# Patient Record
Sex: MALE | Race: BLACK OR AFRICAN AMERICAN
[De-identification: names, ages, dates, MRNs, and addresses within clinical notes are randomized per-mention and may not be internally consistent; named-entity substitution may affect disease eponyms.]

---

## 2018-04-30 ENCOUNTER — HOSPITAL ENCOUNTER (EMERGENCY)
Dept: HOSPITAL 1 - ED | Age: 33
Discharge: HOME | End: 2018-04-30
Payer: COMMERCIAL

## 2018-04-30 VITALS — WEIGHT: 134.99 LBS | HEIGHT: 67.99 IN | BODY MASS INDEX: 20.46 KG/M2

## 2018-04-30 VITALS — DIASTOLIC BLOOD PRESSURE: 67 MMHG | SYSTOLIC BLOOD PRESSURE: 135 MMHG

## 2018-04-30 DIAGNOSIS — I10: ICD-10-CM

## 2018-04-30 DIAGNOSIS — K59.00: Primary | ICD-10-CM

## 2018-04-30 DIAGNOSIS — E11.9: ICD-10-CM

## 2018-04-30 LAB
ALBUMIN SERPL-MCNC: 3.7 G/DL (ref 3.4–5)
ALP SERPL-CCNC: 71 U/L (ref 46–116)
ALT SERPL-CCNC: 38 U/L (ref 16–63)
AMYLASE SERPL-CCNC: 86 U/L (ref 25–115)
AST SERPL-CCNC: 46 U/L (ref 15–37)
BASOPHILS NFR BLD: 0.9 % (ref 0–2)
BILIRUB SERPL-MCNC: 0.2 MG/DL (ref 0.2–1)
BUN SERPL-MCNC: 14 MG/DL (ref 7–18)
CALCIUM SERPL-MCNC: 8.2 MG/DL (ref 8.5–10.1)
CHLORIDE SERPL-SCNC: 103 MMOL/L (ref 98–107)
CO2 SERPL-SCNC: 27.9 MMOL/L (ref 21–32)
CREAT SERPL-MCNC: 0.8 MG/DL (ref 0.7–1.3)
ERYTHROCYTE [DISTWIDTH] IN BLOOD BY AUTOMATED COUNT: 13.8 % (ref 11.5–14.5)
GFR SERPLBLD BASED ON 1.73 SQ M-ARVRAT: > 60 ML/MIN
GLUCOSE SERPL-MCNC: 269 MG/DL (ref 74–106)
LIPASE SERPL-CCNC: 48 IU/L (ref 73–393)
MICROSCOPIC UR-IMP: NO
PLATELET # BLD: 226 X10^3MCL (ref 130–400)
POTASSIUM SERPL-SCNC: 4.5 MMOL/L (ref 3.5–5.1)
PROT SERPL-MCNC: 6.5 G/DL (ref 6.4–8.2)
RBC # UR STRIP.AUTO: NEGATIVE /UL
SODIUM SERPL-SCNC: 136 MMOL/L (ref 136–145)
UA SPECIFIC GRAVITY: 1.01 (ref 1–1.03)

## 2018-05-04 ENCOUNTER — HOSPITAL ENCOUNTER (INPATIENT)
Dept: HOSPITAL 36 - ER | Age: 33
Discharge: LEFT BEFORE DISCHARGE | DRG: 445 | End: 2018-05-04
Attending: FAMILY MEDICINE | Admitting: FAMILY MEDICINE
Payer: COMMERCIAL

## 2018-05-04 DIAGNOSIS — E87.1: ICD-10-CM

## 2018-05-04 DIAGNOSIS — E11.43: ICD-10-CM

## 2018-05-04 DIAGNOSIS — K80.20: Primary | ICD-10-CM

## 2018-05-04 DIAGNOSIS — Z90.49: ICD-10-CM

## 2018-05-04 DIAGNOSIS — E86.0: ICD-10-CM

## 2018-05-04 DIAGNOSIS — Z53.21: ICD-10-CM

## 2018-05-04 DIAGNOSIS — K31.84: ICD-10-CM

## 2018-05-04 DIAGNOSIS — K56.609: ICD-10-CM

## 2018-05-04 DIAGNOSIS — R56.9: ICD-10-CM

## 2018-05-04 DIAGNOSIS — F17.210: ICD-10-CM

## 2018-05-04 DIAGNOSIS — E03.9: ICD-10-CM

## 2018-05-04 DIAGNOSIS — K59.00: ICD-10-CM

## 2018-05-04 DIAGNOSIS — K52.9: ICD-10-CM

## 2018-05-04 DIAGNOSIS — D64.9: ICD-10-CM

## 2018-05-04 LAB
ANION GAP SERPL CALC-SCNC: 12.6 MMOL/L (ref 7–16)
APPEARANCE UR: CLEAR
BACTERIA #/AREA URNS HPF: (no result) /HPF
BASOPHILS # BLD AUTO: 0.1 TH/CUMM (ref 0–0.2)
BASOPHILS NFR BLD AUTO: 0.9 % (ref 0–2)
BILIRUB UR-MCNC: NEGATIVE MG/DL
BUN SERPL-MCNC: 13 MG/DL (ref 7–25)
CALCIUM SERPL-MCNC: 8.8 MG/DL (ref 8.6–10.3)
CHLORIDE SERPL-SCNC: 102 MEQ/L (ref 98–107)
CO2 SERPL-SCNC: 23 MEQ/L (ref 21–31)
COLOR UR: YELLOW
CREAT SERPL-MCNC: 0.8 MG/DL (ref 0.7–1.3)
EOSINOPHIL # BLD AUTO: 0 TH/CMM (ref 0.1–0.4)
EOSINOPHIL NFR BLD AUTO: 0.2 % (ref 0–5)
EPI CELLS URNS QL MICRO: (no result) /LPF
ERYTHROCYTE [DISTWIDTH] IN BLOOD BY AUTOMATED COUNT: 13.7 % (ref 11.5–20)
GLUCOSE SERPL-MCNC: 368 MG/DL (ref 70–105)
GLUCOSE UR STRIP-MCNC: >=1000 MG/DL
HBA1C MFR BLD: 9.4 % (ref 4–6)
HCT VFR BLD CALC: 34.4 % (ref 41–60)
HGB BLD-MCNC: 11.7 GM/DL (ref 12–16)
HGB BLD-MCNC: 13 G/DL (ref 4–35)
KETONES UR STRIP-MCNC: 15 MG/DL
LEUKOCYTE ESTERASE UR-ACNC: NEGATIVE
LYMPHOCYTE AB SER FC-ACNC: 1 TH/CMM (ref 1.5–3)
LYMPHOCYTES NFR BLD AUTO: 12.1 % (ref 20–50)
MAGNESIUM SERPL-MCNC: 2.1 MG/DL (ref 1.9–2.7)
MCH RBC QN AUTO: 29.6 PG (ref 26–30)
MCHC RBC AUTO-ENTMCNC: 34 PG (ref 28–36)
MCV RBC AUTO: 87.2 FL (ref 80–99)
MICRO URNS: YES
MONOCYTES # BLD AUTO: 0.6 TH/CMM (ref 0.3–1)
MONOCYTES NFR BLD AUTO: 7.1 % (ref 2–10)
NEUTROPHILS # BLD: 6.7 TH/CMM (ref 1.8–8)
NEUTROPHILS NFR BLD AUTO: 79.7 % (ref 40–80)
NITRITE UR QL STRIP: NEGATIVE
PH UR STRIP: 6 [PH] (ref 4.6–8)
PLATELET # BLD: 230 TH/CMM (ref 150–400)
PMV BLD AUTO: 7.4 FL
POTASSIUM SERPL-SCNC: 3.6 MEQ/L (ref 3.5–5.1)
PROT UR STRIP-MCNC: NEGATIVE MG/DL
RBC # BLD AUTO: 3.95 MIL/CMM (ref 4.3–5.7)
RBC # UR STRIP: NEGATIVE /UL
RBC #/AREA URNS HPF: (no result) /HPF (ref 0–5)
SODIUM SERPL-SCNC: 134 MEQ/L (ref 136–145)
SP GR UR STRIP: 1.02 (ref 1–1.03)
URINALYSIS COMPLETE PNL UR: (no result)
UROBILINOGEN UR STRIP-ACNC: 0.2 E.U./DL (ref 0.2–1)
WBC # BLD AUTO: 8.4 TH/CMM (ref 4.8–10.8)
WBC #/AREA URNS HPF: (no result) /HPF (ref 0–5)
YEAST URNS QL MICRO: (no result) /HPF

## 2018-05-04 NOTE — DIAGNOSTIC IMAGING REPORT
CT scan abdomen and pelvis without intravenous contrast



HISTORY: Pain



Total DLP equals 300



CTDI equals 6.3



Axial sections were obtained from the xiphoid process down to the pubic

symphysis.



The liver exhibits a normal size and contour.  No focal lesions.  Faint

intraluminal hyperdensities seen in the gallbladder consistent with

changes of cholelithiasis.  The spleen appears normal.  No focal

abnormality seen in the kidneys.  No hydronephrosis.



The exam of the pelvis demonstrates preservation of normal fat planes. 

No abnormal soft tissue masses or abnormal fluid collections.



There is mildly distended stool-filled large bowel.  Changes may be

associated with constipation.  Hypodensity seen in the region of the

appendix.  Small appendicoliths cannot be excluded.  Question surgical

changes.



IMPRESSION:

1.  Stool filled somewhat distended large bowel suggesting constipation

2.  Findings consistent with cholelithiasis

3.  Hyperdensity in the region of the appendix that may be associated

with small appendicoliths.  Questionable surgical changes in the area. 

Findings should be correlated clinically and with patient's symptoms and

surgical history.

## 2018-05-04 NOTE — ED PHYSICIAN CHART
ED Chief Complaint/HPI





- Patient Information


Date Seen:: 05/04/18


Time Seen:: 06:45


Chief Complaint:: abdominal pain


History of Present Illness:: 





Yesterday afternoon patient developed vomiting and diarrhea.  He vomited about 

10 times the last 3 times since he's arrived in the emergency department.  He 

has had 5-6 times diarrhea last time about 15 minutes ago.  Right-sided 

abdominal pain started last night.


Allergies:: 


 Allergies











Allergy/AdvReac Type Severity Reaction Status Date / Time


 


No Known Allergies Allergy   Verified 05/04/18 06:38











Vitals:: 


 Vital Signs - 8 hr











  05/04/18





  06:38


 


Temp 98.5 F


 


HR 73


 


RR 18


 


/81


 


O2 Sat % 100











Historian:: Patient


Review:: Nurse's Note Reviewed





<Kilo Golden - Last Filed: 05/04/18 07:13>





- Patient Information


Allergies:: 


 Allergies











Allergy/AdvReac Type Severity Reaction Status Date / Time


 


No Known Allergies Allergy   Verified 05/04/18 06:38











Vitals:: 


 Vital Signs - 8 hr











  05/04/18 05/04/18





  06:38 08:03


 


Temp 98.5 F 


 


HR 73 75


 


RR 18 16


 


/81 144/83


 


O2 Sat % 100 100














<Aire Phoenix - Last Filed: 05/04/18 08:43>





ED Review of Systems





- Review of Systems


General/Constitutional: No fever, No chills, No weight loss, No weakness, No 

diaphoresis, No edema, No loss of appetite


Skin: No skin lesions, No rash, No bruising


Head: No headache, No light-headedness


Eyes: No loss of vision, No pain, No diplopia


ENT: No earache, No nasal drainage, No sore throat, No tinnitus


Neck: No neck pain, No swelling, No thyromegaly, No stiffness, No mass noted


Cardio Vascular: No chest pain, No palpitations, No PND, No orthopnea, No edema


Pulmonary: No SOB, No cough, No sputum, No wheezing


GI: Nausea, Vomiting, Diarrhea, No pain, No melena, No hematochezia, No 

constipation, No hematemesis


G/U: No dysuria, No frequency, No hematuria


Musculoskeletal: No bone or joint pain, No back pain, No muscle pain


Endocrine: No polyuria, No polydipsia


Psychiatric: No prior psych history, No depression, No anxiety, No suicidal 

ideation


Hematopoietic: No bruising, No lymphadenopathy


Allergic/Immuno: No urticaria, No angioedema


Neurological: No syncope, No focal symptoms, No weakness, No paresthesia, No 

headache, No seizure, No dizziness, No confusion, No vertigo





<Kilo Golden - Last Filed: 05/04/18 07:13>





ED Past Medical History





- Past Medical History


Past Medical History: DM, Seizures, Thyroid disorder, Other (hypothyroidism)


Family History: Diabetes Melitus, Other (cirrhosis)


Social History: Smoker, No Alcohol, Other (smokes 4-5 cigarettes a day)


Surgical History: Appendectomy, other (patient had a laparotomy 3 years ago for 

possibly a ruptured appendix (patient isn't absolutely sure))


Psychiatricy History: None


Medication: Reviewed





<Kilo Golden - Last Filed: 05/04/18 07:13>





Family Medical History





- Family Member


  ** Mother


Other Medical History: CIRRHOSIS





  ** Brother


Hx Family Diabetes: Yes





<Kilo Golden - Last Filed: 05/04/18 07:13>





ED Physical Exam





- Physical Examination


General/Constitutional: Awake


Head: Atraumatic


Eyes: Lids, conjuctiva normal, PERRL, EOMI


Skin: Nl inspection, No rash, No skin lesions, No ecchymosis, Well hydrated, No 

lymphadenopathy


ENMT: External ears, nose nl, Nasal exam nl, Lips, teeth, gums nl


Neck: Nontender, Full ROM w/o pain, No JVD, No nuchal rigidity, No bruit, No 

mass, No stridor


Respiratory: Nl effort/Exclusion, Clear to Auscultation, No Wheeze/Rhonchi/Rales


Cardio Vascular: RRR, No murmur, gallop, rubs, NL S1 S2


Other GI comments:: 





Abdomen 2 out of 4 distended; hyperactive bowel sounds; diffuse tenderness with 

rebound tenderness; right sided tenderness is more than left-sided tenderness.  

About 25 cm long midline abdominal incision scar present.


: No CVA tenderness


Extremities: No tenderness or effusion, Full ROM, normal strength in all 

extremities, No edema, Normal digits & nails


Neuro/Psych: Alert/oriented, DTR's symmetric, Normal sensory exam, Normal motor 

strength, Judgement/insight normal, Mood normal, Normal gait, No focal deficits


Misc: Normal back, No paraspinal tenderness





<Kilo Golden - Last Filed: 05/04/18 07:13>





ED Labs/Radiology/EKG Results





- Lab Results


Results: 


 Laboratory Tests











  05/04/18 05/04/18 05/04/18





  07:10 07:10 07:10


 


WBC  8.4  


 


RBC  3.95 L  


 


Hgb  11.7 L  


 


Hct  34.4 L  


 


MCV  87.2  


 


MCH  29.6  


 


MCHC Differential  34.0  


 


RDW  13.7  


 


Plt Count  230  


 


MPV  7.4  


 


Neutrophils %  79.7  


 


Lymphocytes %  12.1 L  


 


Monocytes %  7.1  


 


Eosinophils %  0.2  


 


Basophils %  0.9  


 


Sodium   134 L 


 


Potassium   3.6 


 


Chloride   102 


 


Carbon Dioxide   23.0 


 


Anion Gap   12.6 


 


BUN   13 


 


Creatinine   0.8 


 


Est GFR ( Amer)   > 60.0 


 


Est GFR (Non-Af Amer)   > 60.0 


 


BUN/Creatinine Ratio   16.3 


 


Glucose   368 H 


 


Calcium   8.8 


 


Magnesium   2.1 


 


Lipase    < 3 L











Comments:: 





Glucose: 368





- Radiology Results


Comments:: 





Cholelithiasis; Constipation





<Arie Phoenix - Last Filed: 05/04/18 08:43>





ED Assessment





- Assessment


General Assessment: 





Patient endorsed to Dr. Phoenix at 0715.





<Kilo Golden - Last Filed: 05/04/18 07:13>





ED Septic Shock





- <6hrs of presentation:


Vital Signs: 


 Vital Signs - 8 hr











  05/04/18





  06:38


 


Temp 98.5 F


 


HR 73


 


RR 18


 


/81


 


O2 Sat % 100














<Kilo Golden - Last Filed: 05/04/18 07:13>





- .


Is Septic Shock (SBP<90, OR Lactate>4 mmol\L) present?: No





- <6hrs of presentation:


Vital Signs: 


 Vital Signs - 8 hr











  05/04/18 05/04/18





  06:38 08:03


 


Temp 98.5 F 


 


HR 73 75


 


RR 18 16


 


/81 144/83


 


O2 Sat % 100 100














<Arie Phoenix - Last Filed: 05/04/18 08:43>





ED Reassessment (Disposition)





- Reassessment


Reassessment Condition:: Improved





- Diagnosis


Diagnosis:: 





Dx: Cholithiasis; Constipation; Abdominal Pain; N/V/D/C; SBO; Uncontrolled DM; 

Hyponatremia; Anemia; Dehydration; AGE; Gastroenteritis; Diabetic Gastroparesis





- Aftercare/Follow up Instructions


Aftercare/Follow-Up Instructions:: Counseled pt regarding lab results/diagnosis 

& need follow up, Counseled pt & family regarding lab results/diagnosis & need 

follow up





- Patient Disposition


Discharge/Transfer:: Acute Care w/in this hosp


Accepting Physician:: Dr. Lafleur


Time Called:: 0830


Time Responded:: 08:30


Admitted to:: Med/Surg


Spoke to:: Dr. Lafleur


Admitting Medical Physician:: Dr. Lafleur


Condition at Disposition:: Stable, Improved





<Arie Phoenix - Last Filed: 05/04/18 08:43>

## 2018-05-04 NOTE — GENERAL PROGRESS NOTE
Subjective





- Review of Systems


Service Date: 05/04/18


Events since last encounter: 





CHART REVIEWED


HIDA ORDERED





Objective





- Results


Result Diagrams: 


 05/04/18 07:10





 05/04/18 07:10


Recent Labs: 


 Laboratory Last Values











WBC  8.4 Th/cmm (4.8-10.8)   05/04/18  07:10    


 


RBC  3.95 Mil/cmm (4.30-5.70)  L  05/04/18  07:10    


 


Hgb  11.7 gm/dL (12-16)  L  05/04/18  07:10    


 


Hct  34.4 % (41.0-60)  L  05/04/18  07:10    


 


MCV  87.2 fl (80-99)   05/04/18  07:10    


 


MCH  29.6 pg (26.0-30.0)   05/04/18  07:10    


 


MCHC Differential  34.0 pg (28.0-36.0)   05/04/18  07:10    


 


RDW  13.7 % (11.5-20.0)   05/04/18  07:10    


 


Plt Count  230 Th/cmm (150-400)   05/04/18  07:10    


 


MPV  7.4 fl  05/04/18  07:10    


 


Neutrophils %  79.7 % (40.0-80.0)   05/04/18  07:10    


 


Lymphocytes %  12.1 % (20.0-50.0)  L  05/04/18  07:10    


 


Monocytes %  7.1 % (2.0-10.0)   05/04/18  07:10    


 


Eosinophils %  0.2 % (0.0-5.0)   05/04/18  07:10    


 


Basophils %  0.9 % (0.0-2.0)   05/04/18  07:10    


 


Sodium  134 mEq/L (136-145)  L  05/04/18  07:10    


 


Potassium  3.6 mEq/L (3.5-5.1)   05/04/18  07:10    


 


Chloride  102 mEq/L ()   05/04/18  07:10    


 


Carbon Dioxide  23.0 mEq/L (21.0-31.0)   05/04/18  07:10    


 


Anion Gap  12.6  (7.0-16.0)   05/04/18  07:10    


 


BUN  13 mg/dL (7-25)   05/04/18  07:10    


 


Creatinine  0.8 mg/dL (0.7-1.3)   05/04/18  07:10    


 


Est GFR ( Amer)  > 60.0 ml/min (>90)   05/04/18  07:10    


 


Est GFR (Non-Af Amer)  > 60.0 ml/min  05/04/18  07:10    


 


BUN/Creatinine Ratio  16.3   05/04/18  07:10    


 


Glucose  368 mg/dL ()  H  05/04/18  07:10    


 


POC Glucose  262 MG/DL (70 - 105)  H  05/04/18  11:00    


 


Calcium  8.8 mg/dL (8.6-10.3)   05/04/18  07:10    


 


Magnesium  2.1 mg/dL (1.9-2.7)   05/04/18  07:10    


 


Lipase  < 3 U/L (11-82)  L  05/04/18  07:10    


 


Urine Source  MIDSTREAM   05/04/18  10:25    


 


Urine Color  YELLOW   05/04/18  10:25    


 


Urine Clarity  CLEAR  (CLEAR)   05/04/18  10:25    


 


Urine pH  6.0  (4.6 - 8.0)   05/04/18  10:25    


 


Ur Specific Gravity  1.020  (1.005-1.030)   05/04/18  10:25    


 


Urine Protein  NEGATIVE mg/dL (NEGATIVE)   05/04/18  10:25    


 


Urine Glucose (UA)  >=1000 mg/dL (NEGATIVE)  H  05/04/18  10:25    


 


Urine Ketones  15 mg/dL (NEGATIVE)  H  05/04/18  10:25    


 


Urine Blood  NEGATIVE  (NEGATIVE)   05/04/18  10:25    


 


Urine Nitrate  NEGATIVE  (NEGATIVE)   05/04/18  10:25    


 


Urine Bilirubin  NEGATIVE  (NEGATIVE)   05/04/18  10:25    


 


Urine Urobilinogen  0.2 E.U./dL (0.2 - 1.0)   05/04/18  10:25    


 


Ur Leukocyte Esterase  NEGATIVE  (NEGATIVE)   05/04/18  10:25    


 


Urine RBC  0-2 /hpf (0-5)  H  05/04/18  10:25    


 


Urine WBC  2-5 /hpf (0-5)   05/04/18  10:25    


 


Ur Epithelial Cells  OCCASIONAL /lpf (FEW)   05/04/18  10:25    


 


Urine Bacteria  OCCASIONAL /hpf (NONE SEEN)   05/04/18  10:25    


 


Urine Yeast  FEW /hpf (NONE SEEN)  H  05/04/18  10:25    














- Physical Exam


Vitals and I&O: 


 Vital Signs











Temp  97.8 F   05/04/18 10:11


 


Pulse  68   05/04/18 09:58


 


Resp  16   05/04/18 09:58


 


BP  125/69   05/04/18 09:58


 


Pulse Ox  100   05/04/18 09:58











Active Medications: 


Current Medications





Diphenhydramine HCl (Benadryl  50 Mg/Ml)  25 mg IVP NOW STA


   Stop: 05/04/18 07:46


   Last Admin: 05/04/18 07:45 Dose:  25 mg


Hydromorphone HCl (Dilaudid)  1 mg IVP NOW STA


   Stop: 05/04/18 07:03


   Last Admin: 05/04/18 07:30 Dose:  1 mg


Hydromorphone HCl (Dilaudid)  1 mg IVP NOW STA


   Stop: 05/04/18 08:36


   Last Admin: 05/04/18 09:15 Dose:  1 mg


Sodium Chloride (Nacl 0.9%)  1,000 mls @ 0 mls/hr IV .Q0M ONE


   PRN Reason: Wide Open


   Stop: 05/04/18 07:02


   Last Admin: 05/04/18 07:25 Dose:  1,000 mls/hr


Levofloxacin (Levaquin Pb)  750 mg in 150 mls @ 100 mls/hr IV Q24HR SANTIAGO


   Stop: 07/03/18 11:29


Metronidazole (Flagyl)  500 mg in 100 mls @ 100 mls/hr IV Q8HR SANTIAGO


   Stop: 07/03/18 12:59


Potassium Chloride/Sodium Chloride (0.45% Ns W/20 Meq Kcl)  1,000 mls @ 125 mls/

hr IV .Q8H SANTIAGO


   Stop: 07/03/18 10:22


Insulin Aspart (Novolog Insulin Sliding Scale)  0 units SUBQ ACHS SANTIAGO


   PRN Reason: Protocol


   Stop: 07/03/18 11:29


Insulin Human Regular (Novolin R)  8 units SUBQ X1 ONE


   Stop: 05/04/18 08:32


   Last Admin: 05/04/18 09:15 Dose:  8 units


Metoclopramide HCl (Reglan)  5 mg IVP NOW STA


   Stop: 05/04/18 08:36


   Last Admin: 05/04/18 09:15 Dose:  5 mg


Morphine Sulfate (Morphine)  4 mg IV Q4H PRN


   PRN Reason: Abdominal Pain


   Stop: 07/03/18 10:22


Ondansetron HCl (Zofran)  4 mg IV NOW STA


   Stop: 05/04/18 07:02


   Last Admin: 05/04/18 07:30 Dose:  4 mg


Ondansetron HCl (Zofran)  4 mg IV Q4H PRN


   PRN Reason: Nausea / Vomiting


   Stop: 07/03/18 10:22